# Patient Record
Sex: MALE | Race: WHITE | NOT HISPANIC OR LATINO | URBAN - METROPOLITAN AREA
[De-identification: names, ages, dates, MRNs, and addresses within clinical notes are randomized per-mention and may not be internally consistent; named-entity substitution may affect disease eponyms.]

---

## 2017-01-01 ENCOUNTER — INPATIENT (INPATIENT)
Facility: HOSPITAL | Age: 82
LOS: 1 days | End: 2017-03-28
Attending: INTERNAL MEDICINE | Admitting: INTERNAL MEDICINE
Payer: MEDICARE

## 2017-01-01 VITALS
RESPIRATION RATE: 12 BRPM | TEMPERATURE: 98 F | WEIGHT: 139.99 LBS | DIASTOLIC BLOOD PRESSURE: 97 MMHG | SYSTOLIC BLOOD PRESSURE: 151 MMHG | HEART RATE: 69 BPM | OXYGEN SATURATION: 100 %

## 2017-01-01 VITALS — OXYGEN SATURATION: 2 %

## 2017-01-01 DIAGNOSIS — H40.9 UNSPECIFIED GLAUCOMA: Chronic | ICD-10-CM

## 2017-01-01 DIAGNOSIS — Z29.9 ENCOUNTER FOR PROPHYLACTIC MEASURES, UNSPECIFIED: ICD-10-CM

## 2017-01-01 DIAGNOSIS — I63.9 CEREBRAL INFARCTION, UNSPECIFIED: ICD-10-CM

## 2017-01-01 DIAGNOSIS — I25.10 ATHEROSCLEROTIC HEART DISEASE OF NATIVE CORONARY ARTERY WITHOUT ANGINA PECTORIS: ICD-10-CM

## 2017-01-01 DIAGNOSIS — I10 ESSENTIAL (PRIMARY) HYPERTENSION: ICD-10-CM

## 2017-01-01 DIAGNOSIS — G40.909 EPILEPSY, UNSPECIFIED, NOT INTRACTABLE, WITHOUT STATUS EPILEPTICUS: ICD-10-CM

## 2017-01-01 LAB
ALBUMIN SERPL ELPH-MCNC: 3.9 G/DL — SIGNIFICANT CHANGE UP (ref 3.3–5)
ALBUMIN SERPL ELPH-MCNC: 4.2 G/DL — SIGNIFICANT CHANGE UP (ref 3.3–5)
ALP SERPL-CCNC: 76 U/L — SIGNIFICANT CHANGE UP (ref 40–120)
ALP SERPL-CCNC: 87 U/L — SIGNIFICANT CHANGE UP (ref 40–120)
ALT FLD-CCNC: 10 U/L — SIGNIFICANT CHANGE UP (ref 4–41)
ALT FLD-CCNC: 13 U/L — SIGNIFICANT CHANGE UP (ref 4–41)
ANISOCYTOSIS BLD QL: SLIGHT — SIGNIFICANT CHANGE UP
APTT BLD: 25.8 SEC — LOW (ref 27.5–37.4)
APTT BLD: 34.5 SEC — SIGNIFICANT CHANGE UP (ref 27.5–37.4)
AST SERPL-CCNC: 18 U/L — SIGNIFICANT CHANGE UP (ref 4–40)
AST SERPL-CCNC: 21 U/L — SIGNIFICANT CHANGE UP (ref 4–40)
BASE EXCESS BLDV CALC-SCNC: -0.2 MMOL/L — SIGNIFICANT CHANGE UP
BASE EXCESS BLDV CALC-SCNC: 4 MMOL/L — SIGNIFICANT CHANGE UP
BASOPHILS # BLD AUTO: 0 K/UL — SIGNIFICANT CHANGE UP (ref 0–0.2)
BASOPHILS # BLD AUTO: 0.03 K/UL — SIGNIFICANT CHANGE UP (ref 0–0.2)
BASOPHILS NFR BLD AUTO: 0 % — SIGNIFICANT CHANGE UP (ref 0–2)
BASOPHILS NFR BLD AUTO: 0.5 % — SIGNIFICANT CHANGE UP (ref 0–2)
BASOPHILS NFR SPEC: 0 % — SIGNIFICANT CHANGE UP (ref 0–2)
BILIRUB SERPL-MCNC: 0.3 MG/DL — SIGNIFICANT CHANGE UP (ref 0.2–1.2)
BILIRUB SERPL-MCNC: 1 MG/DL — SIGNIFICANT CHANGE UP (ref 0.2–1.2)
BLD GP AB SCN SERPL QL: NEGATIVE — SIGNIFICANT CHANGE UP
BLOOD GAS VENOUS - CREATININE: 0.78 MG/DL — SIGNIFICANT CHANGE UP (ref 0.5–1.3)
BLOOD GAS VENOUS - CREATININE: 0.82 MG/DL — SIGNIFICANT CHANGE UP (ref 0.5–1.3)
BUN SERPL-MCNC: 18 MG/DL — SIGNIFICANT CHANGE UP (ref 7–23)
BUN SERPL-MCNC: 19 MG/DL — SIGNIFICANT CHANGE UP (ref 7–23)
BUN SERPL-MCNC: 19 MG/DL — SIGNIFICANT CHANGE UP (ref 7–23)
CALCIUM SERPL-MCNC: 10 MG/DL — SIGNIFICANT CHANGE UP (ref 8.4–10.5)
CALCIUM SERPL-MCNC: 10 MG/DL — SIGNIFICANT CHANGE UP (ref 8.4–10.5)
CALCIUM SERPL-MCNC: 10.3 MG/DL — SIGNIFICANT CHANGE UP (ref 8.4–10.5)
CHLORIDE BLDV-SCNC: 105 MMOL/L — SIGNIFICANT CHANGE UP (ref 96–108)
CHLORIDE BLDV-SCNC: 108 MMOL/L — SIGNIFICANT CHANGE UP (ref 96–108)
CHLORIDE SERPL-SCNC: 100 MMOL/L — SIGNIFICANT CHANGE UP (ref 98–107)
CHLORIDE SERPL-SCNC: 107 MMOL/L — SIGNIFICANT CHANGE UP (ref 98–107)
CHLORIDE SERPL-SCNC: 97 MMOL/L — LOW (ref 98–107)
CHOLEST SERPL-MCNC: 162 MG/DL — SIGNIFICANT CHANGE UP (ref 120–199)
CK MB BLD-MCNC: 1.53 NG/ML — SIGNIFICANT CHANGE UP (ref 1–6.6)
CK MB BLD-MCNC: SIGNIFICANT CHANGE UP (ref 0–2.5)
CK SERPL-CCNC: 51 U/L — SIGNIFICANT CHANGE UP (ref 30–200)
CO2 SERPL-SCNC: 18 MMOL/L — LOW (ref 22–31)
CO2 SERPL-SCNC: 22 MMOL/L — SIGNIFICANT CHANGE UP (ref 22–31)
CO2 SERPL-SCNC: 24 MMOL/L — SIGNIFICANT CHANGE UP (ref 22–31)
CREAT SERPL-MCNC: 0.79 MG/DL — SIGNIFICANT CHANGE UP (ref 0.5–1.3)
CREAT SERPL-MCNC: 0.84 MG/DL — SIGNIFICANT CHANGE UP (ref 0.5–1.3)
CREAT SERPL-MCNC: 0.92 MG/DL — SIGNIFICANT CHANGE UP (ref 0.5–1.3)
EOSINOPHIL # BLD AUTO: 0 K/UL — SIGNIFICANT CHANGE UP (ref 0–0.5)
EOSINOPHIL # BLD AUTO: 0.03 K/UL — SIGNIFICANT CHANGE UP (ref 0–0.5)
EOSINOPHIL NFR BLD AUTO: 0 % — SIGNIFICANT CHANGE UP (ref 0–6)
EOSINOPHIL NFR BLD AUTO: 0.5 % — SIGNIFICANT CHANGE UP (ref 0–6)
EOSINOPHIL NFR FLD: 0 % — SIGNIFICANT CHANGE UP (ref 0–6)
FIBRINOGEN PPP-MCNC: 270.4 MG/DL — LOW (ref 310–510)
GAS PNL BLDV: 132 MMOL/L — LOW (ref 136–146)
GAS PNL BLDV: 134 MMOL/L — LOW (ref 136–146)
GIANT PLATELETS BLD QL SMEAR: PRESENT — SIGNIFICANT CHANGE UP
GLUCOSE BLDV-MCNC: 108 — HIGH (ref 70–99)
GLUCOSE BLDV-MCNC: 197 — HIGH (ref 70–99)
GLUCOSE SERPL-MCNC: 107 MG/DL — HIGH (ref 70–99)
GLUCOSE SERPL-MCNC: 137 MG/DL — HIGH (ref 70–99)
GLUCOSE SERPL-MCNC: 194 MG/DL — HIGH (ref 70–99)
HBA1C BLD-MCNC: 5.5 % — SIGNIFICANT CHANGE UP (ref 4–5.6)
HCO3 BLDV-SCNC: 24 MMOL/L — SIGNIFICANT CHANGE UP (ref 20–27)
HCO3 BLDV-SCNC: 25 MMOL/L — SIGNIFICANT CHANGE UP (ref 20–27)
HCT VFR BLD CALC: 40.4 % — SIGNIFICANT CHANGE UP (ref 39–50)
HCT VFR BLD CALC: 44.8 % — SIGNIFICANT CHANGE UP (ref 39–50)
HCT VFR BLD CALC: 45.8 % — SIGNIFICANT CHANGE UP (ref 39–50)
HCT VFR BLDV CALC: 44.2 % — SIGNIFICANT CHANGE UP (ref 39–51)
HCT VFR BLDV CALC: 48.3 % — SIGNIFICANT CHANGE UP (ref 39–51)
HDLC SERPL-MCNC: 69 MG/DL — HIGH (ref 35–55)
HGB BLD-MCNC: 13.6 G/DL — SIGNIFICANT CHANGE UP (ref 13–17)
HGB BLD-MCNC: 15.2 G/DL — SIGNIFICANT CHANGE UP (ref 13–17)
HGB BLD-MCNC: 15.6 G/DL — SIGNIFICANT CHANGE UP (ref 13–17)
HGB BLDV-MCNC: 14.4 G/DL — SIGNIFICANT CHANGE UP (ref 13–17)
HGB BLDV-MCNC: 15.8 G/DL — SIGNIFICANT CHANGE UP (ref 13–17)
IMM GRANULOCYTES NFR BLD AUTO: 0.2 % — SIGNIFICANT CHANGE UP (ref 0–1.5)
IMM GRANULOCYTES NFR BLD AUTO: 0.3 % — SIGNIFICANT CHANGE UP (ref 0–1.5)
INR BLD: 1.01 — SIGNIFICANT CHANGE UP (ref 0.88–1.17)
INR BLD: 1.26 — HIGH (ref 0.88–1.17)
LACTATE BLDV-MCNC: 1.7 MMOL/L — SIGNIFICANT CHANGE UP (ref 0.5–2)
LACTATE BLDV-MCNC: 2.7 MMOL/L — HIGH (ref 0.5–2)
LIPID PNL WITH DIRECT LDL SERPL: 97 MG/DL — SIGNIFICANT CHANGE UP
LYMPHOCYTES # BLD AUTO: 0.55 K/UL — LOW (ref 1–3.3)
LYMPHOCYTES # BLD AUTO: 0.94 K/UL — LOW (ref 1–3.3)
LYMPHOCYTES # BLD AUTO: 15.1 % — SIGNIFICANT CHANGE UP (ref 13–44)
LYMPHOCYTES # BLD AUTO: 3.6 % — LOW (ref 13–44)
LYMPHOCYTES NFR SPEC AUTO: 5.2 % — LOW (ref 13–44)
MACROCYTES BLD QL: SLIGHT — SIGNIFICANT CHANGE UP
MAGNESIUM SERPL-MCNC: 2 MG/DL — SIGNIFICANT CHANGE UP (ref 1.6–2.6)
MAGNESIUM SERPL-MCNC: 2 MG/DL — SIGNIFICANT CHANGE UP (ref 1.6–2.6)
MCHC RBC-ENTMCNC: 33.2 PG — SIGNIFICANT CHANGE UP (ref 27–34)
MCHC RBC-ENTMCNC: 33.6 PG — SIGNIFICANT CHANGE UP (ref 27–34)
MCHC RBC-ENTMCNC: 33.7 % — SIGNIFICANT CHANGE UP (ref 32–36)
MCHC RBC-ENTMCNC: 33.8 PG — SIGNIFICANT CHANGE UP (ref 27–34)
MCHC RBC-ENTMCNC: 33.9 % — SIGNIFICANT CHANGE UP (ref 32–36)
MCHC RBC-ENTMCNC: 34.1 % — SIGNIFICANT CHANGE UP (ref 32–36)
MCV RBC AUTO: 98.5 FL — SIGNIFICANT CHANGE UP (ref 80–100)
MCV RBC AUTO: 98.9 FL — SIGNIFICANT CHANGE UP (ref 80–100)
MCV RBC AUTO: 99.1 FL — SIGNIFICANT CHANGE UP (ref 80–100)
MONOCYTES # BLD AUTO: 0.45 K/UL — SIGNIFICANT CHANGE UP (ref 0–0.9)
MONOCYTES # BLD AUTO: 1.63 K/UL — HIGH (ref 0–0.9)
MONOCYTES NFR BLD AUTO: 10.8 % — SIGNIFICANT CHANGE UP (ref 2–14)
MONOCYTES NFR BLD AUTO: 7.2 % — SIGNIFICANT CHANGE UP (ref 2–14)
MONOCYTES NFR BLD: 4.3 % — SIGNIFICANT CHANGE UP (ref 2–9)
NEUTROPHIL AB SER-ACNC: 89.6 % — HIGH (ref 43–77)
NEUTROPHILS # BLD AUTO: 12.86 K/UL — HIGH (ref 1.8–7.4)
NEUTROPHILS # BLD AUTO: 4.76 K/UL — SIGNIFICANT CHANGE UP (ref 1.8–7.4)
NEUTROPHILS NFR BLD AUTO: 76.4 % — SIGNIFICANT CHANGE UP (ref 43–77)
NEUTROPHILS NFR BLD AUTO: 85.4 % — HIGH (ref 43–77)
PCO2 BLDV: 43 MMHG — SIGNIFICANT CHANGE UP (ref 41–51)
PCO2 BLDV: 53 MMHG — HIGH (ref 41–51)
PH BLDV: 7.36 PH — SIGNIFICANT CHANGE UP (ref 7.32–7.43)
PH BLDV: 7.37 PH — SIGNIFICANT CHANGE UP (ref 7.32–7.43)
PHOSPHATE SERPL-MCNC: 2.3 MG/DL — LOW (ref 2.5–4.5)
PHOSPHATE SERPL-MCNC: 3.6 MG/DL — SIGNIFICANT CHANGE UP (ref 2.5–4.5)
PLATELET # BLD AUTO: 165 K/UL — SIGNIFICANT CHANGE UP (ref 150–400)
PLATELET # BLD AUTO: 189 K/UL — SIGNIFICANT CHANGE UP (ref 150–400)
PLATELET # BLD AUTO: 197 K/UL — SIGNIFICANT CHANGE UP (ref 150–400)
PLATELET COUNT - ESTIMATE: NORMAL — SIGNIFICANT CHANGE UP
PMV BLD: 11.3 FL — SIGNIFICANT CHANGE UP (ref 7–13)
PMV BLD: 11.4 FL — SIGNIFICANT CHANGE UP (ref 7–13)
PMV BLD: 11.6 FL — SIGNIFICANT CHANGE UP (ref 7–13)
PO2 BLDV: 29 MMHG — LOW (ref 35–40)
PO2 BLDV: 56 MMHG — HIGH (ref 35–40)
POTASSIUM BLDV-SCNC: 3.7 MMOL/L — SIGNIFICANT CHANGE UP (ref 3.4–4.5)
POTASSIUM BLDV-SCNC: 6 MMOL/L — HIGH (ref 3.4–4.5)
POTASSIUM SERPL-MCNC: 4 MMOL/L — SIGNIFICANT CHANGE UP (ref 3.5–5.3)
POTASSIUM SERPL-MCNC: 4 MMOL/L — SIGNIFICANT CHANGE UP (ref 3.5–5.3)
POTASSIUM SERPL-MCNC: 5.2 MMOL/L — SIGNIFICANT CHANGE UP (ref 3.5–5.3)
POTASSIUM SERPL-SCNC: 4 MMOL/L — SIGNIFICANT CHANGE UP (ref 3.5–5.3)
POTASSIUM SERPL-SCNC: 4 MMOL/L — SIGNIFICANT CHANGE UP (ref 3.5–5.3)
POTASSIUM SERPL-SCNC: 5.2 MMOL/L — SIGNIFICANT CHANGE UP (ref 3.5–5.3)
PROT SERPL-MCNC: 6.5 G/DL — SIGNIFICANT CHANGE UP (ref 6–8.3)
PROT SERPL-MCNC: 6.6 G/DL — SIGNIFICANT CHANGE UP (ref 6–8.3)
PROTHROM AB SERPL-ACNC: 11.3 SEC — SIGNIFICANT CHANGE UP (ref 9.8–13.1)
PROTHROM AB SERPL-ACNC: 14.2 SEC — HIGH (ref 9.8–13.1)
RBC # BLD: 4.1 M/UL — LOW (ref 4.2–5.8)
RBC # BLD: 4.53 M/UL — SIGNIFICANT CHANGE UP (ref 4.2–5.8)
RBC # BLD: 4.62 M/UL — SIGNIFICANT CHANGE UP (ref 4.2–5.8)
RBC # FLD: 13.7 % — SIGNIFICANT CHANGE UP (ref 10.3–14.5)
RBC # FLD: 13.8 % — SIGNIFICANT CHANGE UP (ref 10.3–14.5)
RBC # FLD: 14.1 % — SIGNIFICANT CHANGE UP (ref 10.3–14.5)
RH IG SCN BLD-IMP: POSITIVE — SIGNIFICANT CHANGE UP
RH IG SCN BLD-IMP: POSITIVE — SIGNIFICANT CHANGE UP
SAO2 % BLDV: 46 % — LOW (ref 60–85)
SAO2 % BLDV: 86.7 % — HIGH (ref 60–85)
SODIUM SERPL-SCNC: 136 MMOL/L — SIGNIFICANT CHANGE UP (ref 135–145)
SODIUM SERPL-SCNC: 138 MMOL/L — SIGNIFICANT CHANGE UP (ref 135–145)
SODIUM SERPL-SCNC: 144 MMOL/L — SIGNIFICANT CHANGE UP (ref 135–145)
TRIGL SERPL-MCNC: 53 MG/DL — SIGNIFICANT CHANGE UP (ref 10–149)
TROPONIN T SERPL-MCNC: < 0.06 NG/ML — SIGNIFICANT CHANGE UP (ref 0–0.06)
VARIANT LYMPHS # BLD: 0.9 % — SIGNIFICANT CHANGE UP
WBC # BLD: 15.05 K/UL — HIGH (ref 3.8–10.5)
WBC # BLD: 15.07 K/UL — HIGH (ref 3.8–10.5)
WBC # BLD: 6.23 K/UL — SIGNIFICANT CHANGE UP (ref 3.8–10.5)
WBC # FLD AUTO: 15.05 K/UL — HIGH (ref 3.8–10.5)
WBC # FLD AUTO: 15.07 K/UL — HIGH (ref 3.8–10.5)
WBC # FLD AUTO: 6.23 K/UL — SIGNIFICANT CHANGE UP (ref 3.8–10.5)

## 2017-01-01 PROCEDURE — 70498 CT ANGIOGRAPHY NECK: CPT | Mod: 26

## 2017-01-01 PROCEDURE — 76604 US EXAM CHEST: CPT | Mod: 26

## 2017-01-01 PROCEDURE — 70496 CT ANGIOGRAPHY HEAD: CPT | Mod: 26,59

## 2017-01-01 PROCEDURE — 99222 1ST HOSP IP/OBS MODERATE 55: CPT

## 2017-01-01 PROCEDURE — 99223 1ST HOSP IP/OBS HIGH 75: CPT

## 2017-01-01 PROCEDURE — 99291 CRITICAL CARE FIRST HOUR: CPT | Mod: 25

## 2017-01-01 PROCEDURE — 93308 TTE F-UP OR LMTD: CPT | Mod: 26

## 2017-01-01 PROCEDURE — 70450 CT HEAD/BRAIN W/O DYE: CPT | Mod: 26

## 2017-01-01 PROCEDURE — 36600 WITHDRAWAL OF ARTERIAL BLOOD: CPT | Mod: 59

## 2017-01-01 PROCEDURE — 70450 CT HEAD/BRAIN W/O DYE: CPT | Mod: 26,59

## 2017-01-01 PROCEDURE — 70450 CT HEAD/BRAIN W/O DYE: CPT | Mod: 26,77,59

## 2017-01-01 PROCEDURE — 31500 INSERT EMERGENCY AIRWAY: CPT

## 2017-01-01 PROCEDURE — 71010: CPT | Mod: 26

## 2017-01-01 RX ORDER — ROBINUL 0.2 MG/ML
0.2 INJECTION INTRAMUSCULAR; INTRAVENOUS ONCE
Qty: 0 | Refills: 0 | Status: COMPLETED | OUTPATIENT
Start: 2017-01-01 | End: 2017-01-01

## 2017-01-01 RX ORDER — CHLORHEXIDINE GLUCONATE 213 G/1000ML
1 SOLUTION TOPICAL DAILY
Qty: 0 | Refills: 0 | Status: DISCONTINUED | OUTPATIENT
Start: 2017-01-01 | End: 2017-01-01

## 2017-01-01 RX ORDER — HYDROMORPHONE HYDROCHLORIDE 2 MG/ML
2 INJECTION INTRAMUSCULAR; INTRAVENOUS; SUBCUTANEOUS
Qty: 100 | Refills: 0 | Status: DISCONTINUED | OUTPATIENT
Start: 2017-01-01 | End: 2017-01-01

## 2017-01-01 RX ORDER — HYDROMORPHONE HYDROCHLORIDE 2 MG/ML
2 INJECTION INTRAMUSCULAR; INTRAVENOUS; SUBCUTANEOUS ONCE
Qty: 0 | Refills: 0 | Status: DISCONTINUED | OUTPATIENT
Start: 2017-01-01 | End: 2017-01-01

## 2017-01-01 RX ORDER — PROPOFOL 10 MG/ML
10 INJECTION, EMULSION INTRAVENOUS ONCE
Qty: 0 | Refills: 0 | Status: COMPLETED | OUTPATIENT
Start: 2017-01-01 | End: 2017-01-01

## 2017-01-01 RX ORDER — DESMOPRESSIN ACETATE 0.1 MG/1
17 TABLET ORAL ONCE
Qty: 0 | Refills: 0 | Status: COMPLETED | OUTPATIENT
Start: 2017-01-01 | End: 2017-01-01

## 2017-01-01 RX ORDER — PROPOFOL 10 MG/ML
30 INJECTION, EMULSION INTRAVENOUS
Qty: 1000 | Refills: 0 | Status: DISCONTINUED | OUTPATIENT
Start: 2017-01-01 | End: 2017-01-01

## 2017-01-01 RX ORDER — ACETAMINOPHEN 500 MG
650 TABLET ORAL ONCE
Qty: 0 | Refills: 0 | Status: COMPLETED | OUTPATIENT
Start: 2017-01-01 | End: 2017-01-01

## 2017-01-01 RX ORDER — METOCLOPRAMIDE HCL 10 MG
5 TABLET ORAL ONCE
Qty: 0 | Refills: 0 | Status: COMPLETED | OUTPATIENT
Start: 2017-01-01 | End: 2017-01-01

## 2017-01-01 RX ORDER — HYDROMORPHONE HYDROCHLORIDE 2 MG/ML
2 INJECTION INTRAMUSCULAR; INTRAVENOUS; SUBCUTANEOUS
Qty: 10 | Refills: 0 | Status: DISCONTINUED | OUTPATIENT
Start: 2017-01-01 | End: 2017-01-01

## 2017-01-01 RX ORDER — LEVETIRACETAM 250 MG/1
750 TABLET, FILM COATED ORAL EVERY 12 HOURS
Qty: 0 | Refills: 0 | Status: DISCONTINUED | OUTPATIENT
Start: 2017-01-01 | End: 2017-01-01

## 2017-01-01 RX ORDER — NOREPINEPHRINE BITARTRATE/D5W 8 MG/250ML
0.05 PLASTIC BAG, INJECTION (ML) INTRAVENOUS
Qty: 8 | Refills: 0 | Status: DISCONTINUED | OUTPATIENT
Start: 2017-01-01 | End: 2017-01-01

## 2017-01-01 RX ORDER — ONDANSETRON 8 MG/1
4 TABLET, FILM COATED ORAL ONCE
Qty: 0 | Refills: 0 | Status: COMPLETED | OUTPATIENT
Start: 2017-01-01 | End: 2017-01-01

## 2017-01-01 RX ORDER — PROPOFOL 10 MG/ML
50 INJECTION, EMULSION INTRAVENOUS ONCE
Qty: 0 | Refills: 0 | Status: COMPLETED | OUTPATIENT
Start: 2017-01-01 | End: 2017-01-01

## 2017-01-01 RX ORDER — ALTEPLASE 100 MG
5.7 KIT INTRAVENOUS ONCE
Qty: 0 | Refills: 0 | Status: COMPLETED | OUTPATIENT
Start: 2017-01-01 | End: 2017-01-01

## 2017-01-01 RX ORDER — ONDANSETRON 8 MG/1
4 TABLET, FILM COATED ORAL EVERY 4 HOURS
Qty: 0 | Refills: 0 | Status: DISCONTINUED | OUTPATIENT
Start: 2017-01-01 | End: 2017-01-01

## 2017-01-01 RX ORDER — PROPOFOL 10 MG/ML
40 INJECTION, EMULSION INTRAVENOUS ONCE
Qty: 0 | Refills: 0 | Status: COMPLETED | OUTPATIENT
Start: 2017-01-01 | End: 2017-01-01

## 2017-01-01 RX ORDER — PROPOFOL 10 MG/ML
40 INJECTION, EMULSION INTRAVENOUS
Qty: 1000 | Refills: 0 | Status: DISCONTINUED | OUTPATIENT
Start: 2017-01-01 | End: 2017-01-01

## 2017-01-01 RX ORDER — ALTEPLASE 100 MG
51.4 KIT INTRAVENOUS ONCE
Qty: 0 | Refills: 0 | Status: COMPLETED | OUTPATIENT
Start: 2017-01-01 | End: 2017-01-01

## 2017-01-01 RX ADMIN — HYDROMORPHONE HYDROCHLORIDE 2 MILLIGRAM(S): 2 INJECTION INTRAMUSCULAR; INTRAVENOUS; SUBCUTANEOUS at 15:20

## 2017-01-01 RX ADMIN — Medication 63.75 MILLIMOLE(S): at 06:31

## 2017-01-01 RX ADMIN — CHLORHEXIDINE GLUCONATE 1 APPLICATION(S): 213 SOLUTION TOPICAL at 14:00

## 2017-01-01 RX ADMIN — PROPOFOL 10.46 MICROGRAM(S)/KG/MIN: 10 INJECTION, EMULSION INTRAVENOUS at 06:01

## 2017-01-01 RX ADMIN — PROPOFOL 10 MILLIGRAM(S): 10 INJECTION, EMULSION INTRAVENOUS at 03:37

## 2017-01-01 RX ADMIN — Medication 5 MILLIGRAM(S): at 02:51

## 2017-01-01 RX ADMIN — Medication 5.45 MICROGRAM(S)/KG/MIN: at 08:14

## 2017-01-01 RX ADMIN — PROPOFOL 13.94 MICROGRAM(S)/KG/MIN: 10 INJECTION, EMULSION INTRAVENOUS at 04:42

## 2017-01-01 RX ADMIN — HYDROMORPHONE HYDROCHLORIDE 2 MG/HR: 2 INJECTION INTRAMUSCULAR; INTRAVENOUS; SUBCUTANEOUS at 15:20

## 2017-01-01 RX ADMIN — LEVETIRACETAM 400 MILLIGRAM(S): 250 TABLET, FILM COATED ORAL at 05:13

## 2017-01-01 RX ADMIN — ROBINUL 0.2 MILLIGRAM(S): 0.2 INJECTION INTRAMUSCULAR; INTRAVENOUS at 14:51

## 2017-01-01 RX ADMIN — ALTEPLASE 51.4 MILLIGRAM(S): KIT at 19:59

## 2017-01-01 RX ADMIN — PROPOFOL 13.94 MICROGRAM(S)/KG/MIN: 10 INJECTION, EMULSION INTRAVENOUS at 08:13

## 2017-01-01 RX ADMIN — LEVETIRACETAM 400 MILLIGRAM(S): 250 TABLET, FILM COATED ORAL at 06:01

## 2017-01-01 RX ADMIN — CHLORHEXIDINE GLUCONATE 1 APPLICATION(S): 213 SOLUTION TOPICAL at 12:35

## 2017-01-01 RX ADMIN — LEVETIRACETAM 400 MILLIGRAM(S): 250 TABLET, FILM COATED ORAL at 17:42

## 2017-01-01 RX ADMIN — PROPOFOL 10.46 MICROGRAM(S)/KG/MIN: 10 INJECTION, EMULSION INTRAVENOUS at 06:32

## 2017-01-01 RX ADMIN — ALTEPLASE 342 MILLIGRAM(S): KIT at 19:43

## 2017-01-01 RX ADMIN — Medication 1 APPLICATION(S): at 17:43

## 2017-01-01 RX ADMIN — Medication 5.45 MICROGRAM(S)/KG/MIN: at 04:42

## 2017-01-01 RX ADMIN — Medication 1 APPLICATION(S): at 06:01

## 2017-01-01 RX ADMIN — PROPOFOL 13.94 MICROGRAM(S)/KG/MIN: 10 INJECTION, EMULSION INTRAVENOUS at 19:17

## 2017-01-01 RX ADMIN — PROPOFOL 50 MILLIGRAM(S): 10 INJECTION, EMULSION INTRAVENOUS at 03:36

## 2017-01-01 RX ADMIN — PROPOFOL 40 MILLIGRAM(S): 10 INJECTION, EMULSION INTRAVENOUS at 03:35

## 2017-01-01 RX ADMIN — DESMOPRESSIN ACETATE 217 MICROGRAM(S): 0.1 TABLET ORAL at 04:43

## 2017-01-01 RX ADMIN — ONDANSETRON 4 MILLIGRAM(S): 8 TABLET, FILM COATED ORAL at 01:42

## 2017-01-01 RX ADMIN — Medication 650 MILLIGRAM(S): at 02:00

## 2017-03-26 NOTE — ED PROVIDER NOTE - OBJECTIVE STATEMENT
87 yo female with with acute onset of right sided weakness and dysphagia since 3 PM today, 4 hours ago.  Pt was at [employer] and the symptoms came on accutely.  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 85 yo female with with acute onset of right sided weakness and dysphagia since 3 PM today, 4 hours ago.  Pt was at [employer] and the symptoms came on acutely.  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 87 yo female with acute onset of right sided weakness and dysphagia since 3 PM today, 4 hours ago.  Pt was at [employer] and the symptoms came on acutely.  He has a history of multiple tias this year.

## 2017-03-26 NOTE — ED ADULT NURSE NOTE - OBJECTIVE STATEMENT
Pt brought in as code stroke, was at [employer] and at approx 600pm pt had "blank stare, Rt upper arm weakness, rt sided facial droop, expressive aphasia." Pt denies breathing even and unlabored resp. Pt NSR on monitor, denies headache/dizziness. Abdomen is soft, non-tender, non-distended. Skin is cool dry and intact. IVL's placed, labs drawn and sent. MD at bedside. Will continue to monitor.

## 2017-03-26 NOTE — H&P ADULT. - RS GEN PE MLT RESP DETAILS PC
no wheezes/no rales/no rhonchi/respirations non-labored/breath sounds equal/clear to auscultation bilaterally

## 2017-03-26 NOTE — DISCHARGE NOTE ADULT - PATIENT PORTAL LINK FT
“You can access the Follow[facility] Patient Portal, offered by [facility], by registering with the following website: [email or web address]”

## 2017-03-26 NOTE — H&P ADULT. - PROBLEM SELECTOR PLAN 3
- remote h/o PCI s/p stent placement  - no ischemic changes on EKG, cardiac enzymes WNL  - holding home ASA/Plavix pending 24-hr CT head

## 2017-03-26 NOTE — PATIENT PROFILE ADULT. - FUNCTIONAL SCREEN CURRENT LEVEL: COMMUNICATION, MLM
(3) unable to understand or speak (not related to language barrier) (2) difficulty speaking (not related to language barrier)

## 2017-03-26 NOTE — H&P ADULT. - PROBLEM SELECTOR PLAN 1
- presents w/ dysarthria, right-sided weakness - presents w/ acute-onset dysarthria, right-sided weakness concerning for acute CVA vs. post-ictal paralysis in setting of known seizure history  - s/p t-PA in ED for code stroke, initial CT head with no ICH/edema/infarct  - CTA head/neck and brain negative for large vessel arterial occlusion; repeat CT head unchanged   - MICU admission for close neurological monitoring per t-PA protocol  - q1 neuro checks  - 24-hr CT head  - MRI brain w/ contrast  - PT/OT evaluation  - Speech/swallow evaluation  - lipid panel, A1c in AM  - start ASA, Plavix pending 24-hour CTH

## 2017-03-26 NOTE — H&P ADULT. - PROBLEM SELECTOR PLAN 2
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 TIA  w/ temporary right-side paralysis in 12/2016   - p/w acute onset dysarthria, right-sided weakness possibly due to post-ictal paresis/paralysis c/b episode of RUE jerking in ED  - on Keppra 500 PO bid at home w/ compliance per pt daughter  - c/w Keppra 750 IV

## 2017-03-26 NOTE — ED PROVIDER NOTE - ATTENDING CONTRIBUTION TO CARE
I performed the initial face to face bedside interview with this patient regarding history of present illness, review of symptoms and past medical, social and family history and independent physical examination alongside the resident.  I wrote the chart and discussed the case, impression, and plan with the resident and patient.

## 2017-03-26 NOTE — ED PROVIDER NOTE - FAMILY HISTORY
<<-----Click on this checkbox to enter Family History Family history of Parkinson's disease     Sibling  Still living? Unknown  Family history of acute myocardial infarction, Age at diagnosis: Age Unknown

## 2017-03-26 NOTE — H&P ADULT. - PROBLEM SELECTOR PLAN 4
- on home atenolol 12.5 qd  - currently controlled - currently controlled  - holding home atenolol 12.5 qd

## 2017-03-26 NOTE — H&P ADULT. - RADIOLOGY RESULTS AND INTERPRETATION
CT head-Stroke Protocol shows no acute ICH, edema or infarct. CTA head/neck with no stenosis at carotid bifurcation using NASCET criteria. CTA brain shows no large vessel arterial occlusion. CT head-Stroke Protocol shows no acute ICH, edema or infarct. CTA head/neck with no stenosis at carotid bifurcation using NASCET criteria. CTA brain shows no large vessel arterial occlusion. Repeat CT head post seizure-like activity unchanged from previous.

## 2017-03-26 NOTE — ED PROVIDER NOTE - PROGRESS NOTE DETAILS
neuro resident at the bedside, pt tpa candidate, but on plavix, resident on phone with daughter to make decsion

## 2017-03-26 NOTE — ED ADULT NURSE NOTE - CHIEF COMPLAINT QUOTE
Patient brought to ER from [facility] by EMS to r/o stroke. Pt's family noticed this about an hour.  He started to feel weak and stared into space. Pt has weakness to right arm and face, slurred speech and MD was in audience and told them to go to ER to r/o CVA. Pt has hx of TIAs. FS 98. Code stroke initiated.

## 2017-03-26 NOTE — H&P ADULT. - LAB RESULTS AND INTERPRETATION
Labs reviewed. CBC and CMP unremarkable. Labs reviewed. CBC and CMP unremarkable. Cardiac enzymes WNL.

## 2017-03-26 NOTE — DISCHARGE NOTE ADULT - NS AS DC STROKE ED MATERIALS
Need for Followup After Discharge/Call 911 for Stroke/Prescribed Medications/Stroke Education Booklet/Risk Factors for Stroke/Stroke Warning Signs and Symptoms

## 2017-03-26 NOTE — ED ADULT TRIAGE NOTE - CHIEF COMPLAINT QUOTE
Patient brought to ER from [facility] by EMS to r/o stroke. Pt's family noticed this about an hour.  He started to feel weak and stared into space. Pt has weakness to right arm and face, slurred speech and MD was in audience and told them to go to ER to r/o CVA. Pt has hx of TIAs. FS 98. Code stroke initiated.

## 2017-03-26 NOTE — H&P ADULT. - ASSESSMENT
85 yo M with PMH multiple seizures on Keppra (most recent 12/2016), CAD s/p PCI (7 stents) and HTN presents for acute onset dysarthria and RUE weakness secondary to post-ictal paresis vs. acute stroke s/p thrombolytic therapy

## 2017-03-26 NOTE — H&P ADULT. - HISTORY OF PRESENT ILLNESS
87 yo M with PMH seizure DO on Keppra, CAD s/p PCI (7 stents) and HTN presents for acute onset difficulty with speech, and agitation concerning for stroke. Daughter at bedside provided details of HPI as patient ability to speak limited. Pt was at [employer] with his spouse and neighbors when they noticed he looked unwell and became agitated. Pt noted to have facial dropping on right side, limp right arm with limited movement. Patient able to speak but speech was garbled as noted in past when patient experiences seizures. No jerking movements, tongue-biting, bowel/bladder incontinence was witnessed. Pt took his AM Keppra and has been compliant with all his medications. Pt daughter also notes patient having increased phlegm production as noted during hospitalization for grand-mal seizures (02/2015, 10/2015) which initially presented w/ stroke-like symptoms. Pt follows with Dr. Husam Johnson for management of seizures. Pt recently experienced temporary right-sided paralysis in 12/2016 thought to be due to seizure vs. TIA (etiology unclear). At that time, pt "was messing with his medications" per daughter.     ED course:  VS:   s/p t-PA at 7:45 pm  CT head unremarkable, CTA head/neck w/ flow-limiting stenosis of B/L ICA 85 yo M with PMH seizure DO on Keppra, CAD s/p PCI (7 stents) and HTN presents for acute onset difficulty with speech, and agitation concerning for stroke. Daughter at bedside provided details of HPI as patient ability to speak limited. Pt was at [employer] with his spouse and neighbors when they noticed he looked unwell and became agitated. Pt noted to have facial dropping on right side, limp right arm with limited movement. Patient able to speak but speech was garbled as noted in past when patient experiences seizures. No jerking movements, tongue-biting, bowel/bladder incontinence was witnessed. Pt took his AM Keppra and has been compliant with all his medications. Pt daughter also notes patient having increased phlegm production as noted during hospitalization for grand-mal seizures (02/2015, 10/2015) which initially presented w/ stroke-like symptoms. Pt follows with Dr. Husam Johnson for management of seizures. Pt recently experienced temporary right-sided paralysis in 12/2016 thought to be due to seizure vs. TIA (etiology unclear). At that time, pt "was messing with his medications" per daughter. Pt lives at home with spouse. ADLs are independent but also has visiting home aide daily.     ED course:  VS: T 97.8, P 60, /76, R 18, SpO2 100%  s/p t-PA at 7:45 pm  CT head unremarkable, CTA head/neck w/ flow-limiting stenosis of B/L ICA 85 yo M with PMH seizure DO on Keppra, CAD s/p PCI (7 stents) and HTN presents for acute onset difficulty with speech, and agitation concerning for stroke. Daughter at bedside provided details of HPI as patient ability to speak limited. Pt was at [employer] with his spouse and neighbors when they noticed he looked unwell and became agitated. Pt noted to have facial dropping on right side, limp right arm with limited movement. Patient able to speak but speech was garbled as noted in past when patient experiences seizures. No jerking movements, tongue-biting, bowel/bladder incontinence was witnessed. Pt took his AM Keppra and has been compliant with all his medications. Pt daughter also notes patient having increased phlegm production as noted during hospitalization for grand-mal seizures (02/2015, 10/2015) which initially presented w/ stroke-like symptoms. Pt follows with Dr. Husam Johnson for management of seizures. Pt recently experienced temporary right-sided paralysis in 12/2016 thought to be due to seizure vs. TIA (etiology unclear). At that time, pt "was messing with his medications" per daughter. Pt lives at home with spouse. ADLs are independent but also has visiting home aide daily.     In ED stroke code called and patient received t-PA at 7:45 pm. Initial CTH negative for ICH/infarct/edema. CTA head/neck and brain did not reveal any large vessel occlusion/flow-limiting stenosis. Pt developed seizure-like activity described as repetitive jerking movements of RUE concerning for seizure. Repeat CTH performed but was unchanged from previous w/ signs of infarct. Pt transferred to MICU for closer neurological monitoring per t-PA protocol.     ED course:  VS: T 97.8, P 60, /76, R 18, SpO2 100%  CBC and BMP unremarkable, cardiac enzymes WNL  s/p t-PA at 7:45 pm, CT head (18:54) negative, CTA head/neck negative   repeat CT head (21:00) unchanged

## 2017-03-27 NOTE — SWALLOW BEDSIDE ASSESSMENT ADULT - COMMENTS
Attempted initial assessment of swallow function this afternoon, however, patient currently intubated. Re-consult this department when the patient is extubated. Discussed with RN.

## 2017-03-28 NOTE — DISCHARGE NOTE FOR THE EXPIRED PATIENT - HOSPITAL COURSE
85 yo M with PMH seizure DO on Keppra, CAD s/p PCI (7 stents) and HTN presents for acute onset difficulty with speech, and agitation concerning for stroke. Daughter at bedside provided details of HPI as patient ability to speak limited. Pt was at [employer] with his spouse and neighbors when they noticed he looked unwell and became agitated. Pt noted to have facial dropping on right side, limp right arm with limited movement. Patient able to speak but speech was garbled as noted in past when patient experiences seizures. No jerking movements, tongue-biting, bowel/bladder incontinence was witnessed. Pt took his AM Keppra and has been compliant with all his medications. Pt daughter also notes patient having increased phlegm production as noted during hospitalization for grand-mal seizures (02/2015, 10/2015) which initially presented w/ stroke-like symptoms. Pt follows with Dr. Husam Johnson for management of seizures. Pt recently experienced temporary right-sided paralysis in 12/2016 thought to be due to seizure vs. TIA (etiology unclear). At that time, pt "was messing with his medications" per daughter. Pt lives at home with spouse. ADLs are independent but also has visiting home aide daily.     In ED stroke code called and patient received t-PA at 7:45 pm. Initial CTH negative for ICH/infarct/edema. CTA head/neck and brain did not reveal any large vessel occlusion/flow-limiting stenosis. Pt developed seizure-like activity described as repetitive jerking movements of RUE concerning for seizure. Repeat CTH performed but was unchanged from previous w/ signs of infarct. Pt transferred to MICU for closer neurological monitoring per t-PA protocol.     ED course:  VS: T 97.8, P 60, /76, R 18, SpO2 100%  CBC and BMP unremarkable, cardiac enzymes WNL  s/p t-PA at 7:45 pm, CT head (18:54) negative, CTA head/neck negative   repeat CT head (21:00) unchanged     Hospital Course: pt received tpA for a presumed stroke, Pt in bigeminy w/ transient desats to high 80s.  EKG shows deepened T waves.     330am acute emesis stat ct head with intraventricular bleed and hydrocephalus patient unresponsive on arrival to floor intubated with prop, family notified, full code, neurosurgery consulted. 1 U platelets, ddAVP given to reverse DAP. NeuroSx: no intervention recommended at this time, of limited utility, contact neurosx inform them family wants to speak with them. Spoke to neuro stroke team, Goal BP is (120-150) / <100, VS q1-2hr, neuro check q1-2hr,    After discussion with family, decision was made to make pt DNR/DNI after discussion with entire family. Pt family was at bedside for terminal extubation, Time of Death: 15:44, pt family declined autopsy, organ donation contacted.

## 2021-11-09 NOTE — PATIENT PROFILE ADULT. - MEDICATION ADMINISTRATION INFO, PROFILE
Covid positive. Assumed care at 0730. Pt is a/o X 4.  VSS. Attempted to wean to RA. Pt desats to 89%. Denies SOB.  Tolerating reg diet. Up with SBA. Continous pulse Oxymetry. Possible discharge home tomorrow if able to wean off oxygen. Pt started back on IV steroids. Continue to monitor.    no concerns

## 2022-01-31 NOTE — ED PROVIDER NOTE - CONSTITUTIONAL MANNER
Upon review of the In Basket request we were able to note that no further action is required  The patient chart is up to date as a result of a previous request       Any additional questions or concerns should be emailed to the Practice Liaisons via [email or web address] [email or web address]  [email or web address] email, please do not reply via In Basket      Thank you  Emeli Sow MA appropriate for situation

## 2023-12-21 NOTE — ED PROVIDER NOTE - GASTROINTESTINAL, MLM
Interval History:  No issues reported by nursing staff.  Patient awaiting skilled nursing facility placement.  Kidney functions improving and Nephrology signed off    Review of Systems   Unable to perform ROS: Other     Objective:     Vital Signs (Most Recent):  Temp: 98 °F (36.7 °C) (12/21/23 1135)  Pulse: 98 (12/21/23 1200)  Resp: 18 (12/21/23 1135)  BP: (!) 143/67 (12/21/23 1135)  SpO2: 96 % (12/21/23 1135) Vital Signs (24h Range):  Temp:  [96.6 °F (35.9 °C)-98 °F (36.7 °C)] 98 °F (36.7 °C)  Pulse:  [] 98  Resp:  [18-19] 18  SpO2:  [96 %-100 %] 96 %  BP: (132-148)/(65-74) 143/67     Weight: 112 kg (247 lb)  Body mass index is 30.87 kg/m².    Intake/Output Summary (Last 24 hours) at 12/21/2023 1357  Last data filed at 12/21/2023 1105  Gross per 24 hour   Intake 315 ml   Output --   Net 315 ml      Physical Exam  Constitutional:       General: He is not in acute distress.     Appearance: He is obese.   HENT:      Head: Normocephalic.      Right Ear: External ear normal.      Left Ear: External ear normal.      Nose: Nose normal.      Comments: Nasal cannula  Eyes:      General: No scleral icterus.  Cardiovascular:      Rate and Rhythm: Normal rate.   Pulmonary:      Effort: Pulmonary effort is normal.   Abdominal:      Palpations: Abdomen is soft.      Tenderness: There is no abdominal tenderness.   Genitourinary:     Comments: Condom catheter  Musculoskeletal:      Comments: Debility   Skin:     General: Skin is warm.   Neurological:      Mental Status: He is alert. Mental status is at baseline.         MELD 3.0: 26 at 12/17/2023  7:44 PM  MELD-Na: 25 at 12/17/2023  7:44 PM  Calculated from:  Serum Creatinine: 2.4 mg/dL at 12/17/2023  7:44 PM  Serum Sodium: 125 mmol/L at 12/17/2023  7:44 PM  Total Bilirubin: 0.7 mg/dL (Using min of 1 mg/dL) at 12/17/2023  9:04 AM  Serum Albumin: 2.4 g/dL at 12/17/2023  9:04 AM  INR(ratio): 1.0 at 12/15/2023  2:16 PM  Age at listing (hypothetical): 68 years  Sex: Male at  "12/17/2023  7:44 PM      Significant Labs:  CBC:  Recent Labs   Lab 12/20/23  0540 12/21/23  0421   WBC 5.33 7.38   HGB 13.4* 13.8*   HCT 37.3* 39.1*    211     CMP:  Recent Labs   Lab 12/19/23  1937 12/20/23  0540 12/20/23  1159 12/21/23  0421   *  --  136 137   K 2.9*  --  3.1* 3.8   CL 95  --  96 100   CO2 28  --  30* 28   *  --  127* 120*   BUN 43*  --  46* 47*   CREATININE 2.2*  --  2.0* 1.7*   CALCIUM 8.9  --  8.9 9.1   PROT  --  6.1  --  6.4   ALBUMIN  --  2.3*  --  2.4*   BILITOT  --  0.5  --  0.5   ALKPHOS  --  67  --  73   AST  --  62*  --  54*   ALT  --  52*  --  47*   ANIONGAP 12  --  10 9     PTINR:  No results for input(s): "INR" in the last 48 hours.    Significant Procedures:   Dobutamine Stress Test with Color Flow: No results found for this or any previous visit.      " Abdomen soft, non-tender, no guarding.